# Patient Record
Sex: FEMALE | Race: WHITE | Employment: UNEMPLOYED | URBAN - METROPOLITAN AREA
[De-identification: names, ages, dates, MRNs, and addresses within clinical notes are randomized per-mention and may not be internally consistent; named-entity substitution may affect disease eponyms.]

---

## 2023-07-05 ENCOUNTER — HOSPITAL ENCOUNTER (EMERGENCY)
Age: 43
Discharge: HOME | End: 2023-07-05
Attending: EMERGENCY MEDICINE
Payer: MEDICARE

## 2023-07-05 ENCOUNTER — APPOINTMENT (OUTPATIENT)
Dept: GENERAL RADIOLOGY | Age: 43
End: 2023-07-05
Payer: MEDICARE

## 2023-07-05 VITALS
RESPIRATION RATE: 16 BRPM | OXYGEN SATURATION: 97 % | WEIGHT: 171.3 LBS | SYSTOLIC BLOOD PRESSURE: 137 MMHG | HEART RATE: 80 BPM | DIASTOLIC BLOOD PRESSURE: 79 MMHG | TEMPERATURE: 98.8 F

## 2023-07-05 DIAGNOSIS — S93.401A SPRAIN OF RIGHT ANKLE, UNSPECIFIED LIGAMENT, INITIAL ENCOUNTER: Primary | ICD-10-CM

## 2023-07-05 PROCEDURE — 99283 EMERGENCY DEPT VISIT LOW MDM: CPT

## 2023-07-05 PROCEDURE — 73610 X-RAY EXAM OF ANKLE: CPT

## 2023-07-05 PROCEDURE — 6370000000 HC RX 637 (ALT 250 FOR IP): Performed by: EMERGENCY MEDICINE

## 2023-07-05 RX ORDER — IBUPROFEN 800 MG/1
800 TABLET ORAL EVERY 6 HOURS PRN
Qty: 30 TABLET | Refills: 0 | Status: SHIPPED | OUTPATIENT
Start: 2023-07-05

## 2023-07-05 RX ORDER — HYDROCODONE BITARTRATE AND ACETAMINOPHEN 5; 325 MG/1; MG/1
1 TABLET ORAL ONCE
Status: COMPLETED | OUTPATIENT
Start: 2023-07-05 | End: 2023-07-05

## 2023-07-05 RX ORDER — IBUPROFEN 800 MG/1
800 TABLET ORAL ONCE
Status: COMPLETED | OUTPATIENT
Start: 2023-07-05 | End: 2023-07-05

## 2023-07-05 RX ORDER — ACETAMINOPHEN 325 MG/1
650 TABLET ORAL EVERY 8 HOURS PRN
Qty: 60 TABLET | Refills: 0 | Status: SHIPPED | OUTPATIENT
Start: 2023-07-05

## 2023-07-05 RX ADMIN — IBUPROFEN 800 MG: 800 TABLET ORAL at 10:09

## 2023-07-05 RX ADMIN — HYDROCODONE BITARTRATE AND ACETAMINOPHEN 1 TABLET: 5; 325 TABLET ORAL at 10:09

## 2023-07-05 ASSESSMENT — PAIN SCALES - GENERAL: PAINLEVEL_OUTOF10: 8

## 2023-07-05 ASSESSMENT — PAIN - FUNCTIONAL ASSESSMENT: PAIN_FUNCTIONAL_ASSESSMENT: 0-10

## 2023-07-05 NOTE — ED PROVIDER NOTES
[address] ED  Emergency Department        Pt Tj Tang  MRN: [id number]  Birthdate 1980  Date of evaluation: 7/5/23  PCP:  No primary care provider on file. Note Started: 10:04 AM EDT      CHIEF COMPLAINT       Chief Complaint   Patient presents with    Ankle Pain     Rolled right ankle yesterday       HISTORY OF PRESENT ILLNESS  (Location/Symptom, Timing/Onset, Context/Setting, Quality, Duration, Modifying Factors, Severity.)      Cullen De Souza is a 37 y.o. female who presents with ankle injury. Had 2 falls yesterday once due to catching foot on flip flop,  In the other from tripping. Does have prior history of significant ankle injury with 5 surgeries starting at the age of 12. Does report some numbness to the great toe and the plantar toe. PAST MEDICAL / SURGICAL / SOCIAL / FAMILY HISTORY      has no past medical history on file. No significant past medical history per review with patient. has a past surgical history that includes ORIF ankle fracture (Right). Social History     Socioeconomic History    Marital status: Single     Spouse name: Not on file    Number of children: Not on file    Years of education: Not on file    Highest education level: Not on file   Occupational History    Not on file   Tobacco Use    Smoking status: Not on file    Smokeless tobacco: Not on file   Substance and Sexual Activity    Alcohol use: Not on file    Drug use: Not on file    Sexual activity: Not on file   Other Topics Concern    Not on file   Social History Narrative    Not on file     Social Determinants of Health     Financial Resource Strain: Not on file   Food Insecurity: Not on file   Transportation Needs: Not on file   Physical Activity: Not on file   Stress: Not on file   Social Connections: Not on file   Intimate Partner Violence: Not on file   Housing Stability: Not on file       History reviewed. No pertinent family history.     Allergies:  Patient has no known

## 2023-07-05 NOTE — DISCHARGE INSTRUCTIONS
Care  Adult Primary Care  OB/Prenatal Monday - Wednesday, Friday Monday - Friday 8a - 12p  Thursday 8a - 4:45p   [facility]  [address] [facility]  [phone] [address] [facility]  [phone] Pregnancy  Pre & Post Adoption Counseling  Pregnancy Support  Prenatal / nutrition Care  Rew[employer] Incentive Program Mon & Thurs (10a - 2p)  Tues (10a - 430p)  [facility] (9a - 1p)   [address]  [address] [facility]   [phone]  Adult Internal Medicine   [phone]  Neuro / Headache  [phone] Monday - Friday  [address] [place] [address] Drive  [phone] Family Practice Monday - Friday 9a - 5p   [facility]  [address]  [phone] [facility] Practice Monday, Tuesday, Thursday, Friday  9a - 5p  (closed 12p - 1p)  Wednesday 1p - 5p   [address]  [phone] Burn/Plastric, ENT, GI, Orthopedics, Surgical / Trauma, Urology, Vascular Call for an appointment   [facility]  [address]  [phone] Adult Medicine, [address] , Dental  Patient must be certified homeless  Under age 25 not accepted Days and hours vary (Doors open at 8:30a - day of week varies)  Call for an appointment     [facility]  [phone]  OB   [phone] Monday, Tuesday, Wednesday, Friday 9a - 5p  Thursday 9a - 6p  Wednesday (OB only)   [facility]  [address]  [phone]  [phone]   OB/Prenatal      OB/Prenatal Monday - Thursday pa - 6p  Friday 10a - 3p  Saturday 1st & 3rd 10a - 2p  Wednesday 6p - 8p (HIV Testing)  Monday - Friday 10a - 3p   [facility] [address] [facility]  [phone] Free nurse visits  [place] programs  Counseling  Pregnancy Class Call   [facility]  [address] [facility]  [phone] [address]  [address] [address], [place]

## 2023-07-05 NOTE — ED NOTES
Aircasrt applied to R ankle. Crutches set to appropriate patient height. Patient demonstrated proper crutch use.      Susana Lepe LPN  75/26/30 4179

## 2023-07-05 NOTE — ED NOTES
Patient presents to the ED with c/o right ankle pain/injury. Patient states that she had rolled her ankle x2 yesterday while tripping on her flip flops. Patient states she did fall denies hitting her head. Patient reports surgery from breaking the right ankle a few years ago. Patient c/o 8/10 pain. Patient is alert and oriented x4, answering questions appropriately.        Jennifer Arteaga RN  07/05/23 1045